# Patient Record
Sex: MALE | Race: OTHER | Employment: FULL TIME | URBAN - METROPOLITAN AREA
[De-identification: names, ages, dates, MRNs, and addresses within clinical notes are randomized per-mention and may not be internally consistent; named-entity substitution may affect disease eponyms.]

---

## 2022-12-08 ENCOUNTER — HOSPITAL ENCOUNTER (EMERGENCY)
Age: 27
Discharge: HOME | End: 2022-12-08
Attending: STUDENT IN AN ORGANIZED HEALTH CARE EDUCATION/TRAINING PROGRAM
Payer: COMMERCIAL

## 2022-12-08 VITALS
HEART RATE: 58 BPM | TEMPERATURE: 98.4 F | DIASTOLIC BLOOD PRESSURE: 75 MMHG | OXYGEN SATURATION: 97 % | HEIGHT: 62 IN | BODY MASS INDEX: 23.77 KG/M2 | WEIGHT: 129.19 LBS | SYSTOLIC BLOOD PRESSURE: 120 MMHG | RESPIRATION RATE: 16 BRPM

## 2022-12-08 DIAGNOSIS — S03.2XXA TOOTH AVULSION, INITIAL ENCOUNTER: Primary | ICD-10-CM

## 2022-12-08 PROCEDURE — 6360000002 HC RX W HCPCS: Performed by: STUDENT IN AN ORGANIZED HEALTH CARE EDUCATION/TRAINING PROGRAM

## 2022-12-08 PROCEDURE — 90471 IMMUNIZATION ADMIN: CPT | Performed by: STUDENT IN AN ORGANIZED HEALTH CARE EDUCATION/TRAINING PROGRAM

## 2022-12-08 PROCEDURE — 90715 TDAP VACCINE 7 YRS/> IM: CPT | Performed by: STUDENT IN AN ORGANIZED HEALTH CARE EDUCATION/TRAINING PROGRAM

## 2022-12-08 PROCEDURE — 99284 EMERGENCY DEPT VISIT MOD MDM: CPT

## 2022-12-08 RX ORDER — AMOXICILLIN AND CLAVULANATE POTASSIUM 875; 125 MG/1; MG/1
1 TABLET, FILM COATED ORAL 2 TIMES DAILY
Qty: 14 TABLET | Refills: 0 | Status: SHIPPED | OUTPATIENT
Start: 2022-12-08 | End: 2022-12-15

## 2022-12-08 RX ADMIN — TETANUS TOXOID, REDUCED DIPHTHERIA TOXOID AND ACELLULAR PERTUSSIS VACCINE, ADSORBED 0.5 ML: 5; 2.5; 8; 8; 2.5 SUSPENSION INTRAMUSCULAR at 13:31

## 2022-12-08 ASSESSMENT — PAIN - FUNCTIONAL ASSESSMENT
PAIN_FUNCTIONAL_ASSESSMENT: NONE - DENIES PAIN
PAIN_FUNCTIONAL_ASSESSMENT: NONE - DENIES PAIN

## 2022-12-08 NOTE — DISCHARGE INSTRUCTIONS
Dental Emergency Referrals    [address] [facility] [place] residents only)    [facility] AT [place]  [address] [phone]   [facility].  [phone]   [facility]  [address]  [phone]   [facility]  (entrance on [address]. [address] [facility]  [address]  [phone]   [facility] 167.  [phone]   [facility]  [address] [facility] SPECIALTY [facility].  [phone]       [address] [facility]  [address] [facility].  [phone]   [facility]  [address]  [phone]     [facility] MEDICAL [place]  [address] [facility]. 2nd floor  [phone]   [facility]  [address] [phone] ([address] [facility])  [place]: [address]: [address]  [phone]   [address] [facility] 128  [facility]  [phone]     [facility]  [address]  [phone]   [address]  [address] [facility].  Oral Surgery Dept: [phone]  Dental Clinic: [address] [facility]  [phone]     [address] [place] [address] Drive [phone]   [facility]   [address] [place], [address]  [place] : [address] : [phone]     [facility]  [address]    Other [address] in the area    [address] [facility] (Dental Urgent Care)  [facility]  (Across from [facility]ICAL [facility], [address] [place] [address] Road,[address]  [phone]?

## 2022-12-08 NOTE — ED NOTES
Patient given prescription, discharge instructions verbal and written, patient verbalized understanding. Alert/oriented X4, Clear speech.   Patient exhibits no distress, ambulates with steady gait per self leaving unit, no further request.      Saeid Rivera RN  12/08/22 0274

## 2022-12-08 NOTE — ED NOTES
Patient to ed with complaints of dental facial pain after falling at work. Language line used.      Liat Kendrick RN  12/08/22 1394

## 2022-12-09 NOTE — ED PROVIDER NOTES
[address] [address] ENCOUNTER      Pt Name: Simone Hamm  MRN: [id number]  [place] 1995  Date of evaluation: 12/8/2022  Provider: Laurie Curran MD    CHIEF COMPLAINT       Dental pain    HISTORY OF PRESENT ILLNESS   (Location/Symptom, Timing/Onset,Context/Setting, Quality, Duration, Modifying Factors, Severity)  Note limiting factors. Simone Hamm is a 33 y.o. 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 Pain localized to the R medial maxillary incisor, tooth described as avulsed anteriorly and reduced immediately after the incident by the pt. Still described as loose. Had mild bleeding now resolved. Mild pain in this location. Denies LOC, focal weakness, neck pain, focal sensory loss, trismus. Symptoms not otherwise alleviated or exacerbated by other factors. NursingNotes were reviewed. REVIEW OF SYSTEMS    (2-9 systems for level 4, 10 or more for level 5)     8 system ROS otherwise negative unless mentioned in the HPI. PAST MEDICAL HISTORY   History reviewed. No pertinent past medical history. SURGICALHISTORY     History reviewed. No pertinent surgical history. CURRENT MEDICATIONS       Discharge Medication List as of 12/8/2022  2:00 PM          ALLERGIES     Patient has no known allergies. FAMILY HISTORY     History reviewed. No pertinent family history.        SOCIAL HISTORY       Social History     Socioeconomic History    Marital status: Single     Spouse name: None    Number of children: None    Years of education: None    Highest education level: None   Tobacco Use    Smoking status: Unknown   Substance and Sexual Activity    Alcohol use: Not Currently    Drug use: Not Currently       SCREENINGS             PHYSICAL EXAM    (up to 7 for level 4, 8 or more for level 5)     ED Triage Vitals [12/08/22 1300]   BP Temp Temp Source Heart Rate Resp SpO2 Height Weight   120/75 98.4 °F (36.9 °C) Oral 58 16 97 % 5' 1.5\" (1.562 m) 129 lb 3 oz (58.6 kg)       General: Alert and oriented appropriately for age, No acute distress. Eye: Normal conjunctiva. Sclera anicteric. HENT: Oral mucosa is moist. R medial maxillary incisor loose and ttp. No ttp of the maxillae bilaterally, no trismus, no deformities. No malocclusion. Calvarium normocephalic, atraumatic. CERVICAL SPINE: There is no cervical midline tenderness to palpation, step-offs, or acute deformities. No posterior midline pain on full ROM. The cervical spine has been cleared clinically. Respiratory: Respirations even and non-labored. CTAB. Cardiovascular: Normal rate, Regular rhythm. Gastrointestinal: Soft, Non-tender, Non-distended. : deferred. Musculoskeletal: No swelling. No deformities, intact ROM across all joint. Integumentary: Warm, Dry. Neurologic: Alert and appropriate for age. No focal deficits. Psychiatric: Cooperative. DIAGNOSTIC RESULTS       EMERGENCY DEPARTMENT COURSE and DIFFERENTIAL DIAGNOSIS/MDM:   Vitals:    Vitals:    22 1300   BP: 120/75   Pulse: 58   Resp: 16   Temp: 98.4 °F (36.9 °C)   TempSrc: Oral   SpO2: 97%   Weight: 129 lb 3 oz (58.6 kg)   Height: 5' 1.5\" (1.562 m)         Medical decision makin yo M p/w tooth avulsion after fall. [place] CTH and C-spine rules negative. No e/o associated maxillary fx or mandibular fx or other significant facial fx. HDS. Neuro intact. TDaP updated. Abx ppx. Dental follow up. WC info filled out. Given d/c instructions and return precautions, pt voices understanding. D/c home, ambulated steadily from the ED. Medications   Tetanus-Diphth-Acell Pertussis (BOOSTRIX) injection 0.5 mL (0.5 mLs IntraMUSCular Given 22 1331)              FINAL IMPRESSION      1.  Tooth avulsion, initial encounter          DISPOSITION/PLAN   DISPOSITION Decision To Discharge 2022 01:16:41 PM      PATIENT REFERRED TO:  [facility] Emergency Department  [address]  [place] [address]  [phone]    If symptoms worsen    [address] [place] [address] [address] [phone]  On 12/9/2022      [facility]  [address] [place]  [address] [address]  [phone]  On 12/9/2022        DISCHARGE MEDICATIONS:  Discharge Medication List as of 12/8/2022  2:00 PM        START taking these medications    Details   amoxicillin-clavulanate (AUGMENTIN) 875-125 MG per tablet Take 1 tablet by mouth 2 times daily for 7 days, Disp-14 tablet, R-0Print                (Please note that portions of this note were completed with a voice recognition program.Efforts were made to edit the dictations but occasionally words are mis-transcribed.)    Norman Fregoso MD (electronically signed)  Attending Emergency Physician          Norman Fregoso MD  12/15/22 9882